# Patient Record
Sex: MALE | Race: BLACK OR AFRICAN AMERICAN | NOT HISPANIC OR LATINO | URBAN - METROPOLITAN AREA
[De-identification: names, ages, dates, MRNs, and addresses within clinical notes are randomized per-mention and may not be internally consistent; named-entity substitution may affect disease eponyms.]

---

## 2020-03-17 ENCOUNTER — EMERGENCY (EMERGENCY)
Facility: HOSPITAL | Age: 55
LOS: 1 days | Discharge: HOME | End: 2020-03-17
Attending: EMERGENCY MEDICINE | Admitting: EMERGENCY MEDICINE
Payer: COMMERCIAL

## 2020-03-17 VITALS
SYSTOLIC BLOOD PRESSURE: 158 MMHG | HEART RATE: 75 BPM | OXYGEN SATURATION: 100 % | RESPIRATION RATE: 16 BRPM | DIASTOLIC BLOOD PRESSURE: 103 MMHG | HEIGHT: 66 IN | TEMPERATURE: 98 F | WEIGHT: 160.06 LBS

## 2020-03-17 PROCEDURE — 99285 EMERGENCY DEPT VISIT HI MDM: CPT

## 2020-03-18 VITALS
DIASTOLIC BLOOD PRESSURE: 97 MMHG | TEMPERATURE: 98 F | HEART RATE: 87 BPM | SYSTOLIC BLOOD PRESSURE: 137 MMHG | RESPIRATION RATE: 16 BRPM | OXYGEN SATURATION: 100 %

## 2020-03-18 LAB
ALBUMIN SERPL ELPH-MCNC: 4.2 G/DL — SIGNIFICANT CHANGE UP (ref 3.3–5)
ALP SERPL-CCNC: 63 U/L — SIGNIFICANT CHANGE UP (ref 40–120)
ALT FLD-CCNC: 40 U/L — SIGNIFICANT CHANGE UP (ref 4–41)
ANION GAP SERPL CALC-SCNC: 12 MMO/L — SIGNIFICANT CHANGE UP (ref 7–14)
ANION GAP SERPL CALC-SCNC: 14 MMO/L — SIGNIFICANT CHANGE UP (ref 7–14)
APAP SERPL-MCNC: < 15 UG/ML — LOW (ref 15–25)
APPEARANCE UR: CLEAR — SIGNIFICANT CHANGE UP
AST SERPL-CCNC: 33 U/L — SIGNIFICANT CHANGE UP (ref 4–40)
BASE EXCESS BLDV CALC-SCNC: 3.5 MMOL/L — SIGNIFICANT CHANGE UP
BASOPHILS # BLD AUTO: 0.03 K/UL — SIGNIFICANT CHANGE UP (ref 0–0.2)
BASOPHILS NFR BLD AUTO: 0.4 % — SIGNIFICANT CHANGE UP (ref 0–2)
BILIRUB SERPL-MCNC: 0.8 MG/DL — SIGNIFICANT CHANGE UP (ref 0.2–1.2)
BILIRUB UR-MCNC: NEGATIVE — SIGNIFICANT CHANGE UP
BLOOD GAS VENOUS - CREATININE: 1.43 MG/DL — HIGH (ref 0.5–1.3)
BLOOD GAS VENOUS - FIO2: 21 — SIGNIFICANT CHANGE UP
BLOOD UR QL VISUAL: NEGATIVE — SIGNIFICANT CHANGE UP
BUN SERPL-MCNC: 13 MG/DL — SIGNIFICANT CHANGE UP (ref 7–23)
BUN SERPL-MCNC: 15 MG/DL — SIGNIFICANT CHANGE UP (ref 7–23)
CALCIUM SERPL-MCNC: 8.7 MG/DL — SIGNIFICANT CHANGE UP (ref 8.4–10.5)
CALCIUM SERPL-MCNC: 9.4 MG/DL — SIGNIFICANT CHANGE UP (ref 8.4–10.5)
CHLORIDE BLDV-SCNC: 102 MMOL/L — SIGNIFICANT CHANGE UP (ref 96–108)
CHLORIDE SERPL-SCNC: 102 MMOL/L — SIGNIFICANT CHANGE UP (ref 98–107)
CHLORIDE SERPL-SCNC: 98 MMOL/L — SIGNIFICANT CHANGE UP (ref 98–107)
CO2 SERPL-SCNC: 22 MMOL/L — SIGNIFICANT CHANGE UP (ref 22–31)
CO2 SERPL-SCNC: 25 MMOL/L — SIGNIFICANT CHANGE UP (ref 22–31)
COLOR SPEC: SIGNIFICANT CHANGE UP
CREAT SERPL-MCNC: 1.22 MG/DL — SIGNIFICANT CHANGE UP (ref 0.5–1.3)
CREAT SERPL-MCNC: 1.41 MG/DL — HIGH (ref 0.5–1.3)
EOSINOPHIL # BLD AUTO: 0.18 K/UL — SIGNIFICANT CHANGE UP (ref 0–0.5)
EOSINOPHIL NFR BLD AUTO: 2.5 % — SIGNIFICANT CHANGE UP (ref 0–6)
ETHANOL BLD-MCNC: < 10 MG/DL — SIGNIFICANT CHANGE UP
GAS PNL BLDV: 137 MMOL/L — SIGNIFICANT CHANGE UP (ref 136–146)
GLUCOSE BLDV-MCNC: 122 MG/DL — HIGH (ref 70–99)
GLUCOSE SERPL-MCNC: 113 MG/DL — HIGH (ref 70–99)
GLUCOSE SERPL-MCNC: 124 MG/DL — HIGH (ref 70–99)
GLUCOSE UR-MCNC: NEGATIVE — SIGNIFICANT CHANGE UP
HCO3 BLDV-SCNC: 26 MMOL/L — SIGNIFICANT CHANGE UP (ref 20–27)
HCT VFR BLD CALC: 40.7 % — SIGNIFICANT CHANGE UP (ref 39–50)
HCT VFR BLDV CALC: 45.7 % — SIGNIFICANT CHANGE UP (ref 39–51)
HGB BLD-MCNC: 14.3 G/DL — SIGNIFICANT CHANGE UP (ref 13–17)
HGB BLDV-MCNC: 14.9 G/DL — SIGNIFICANT CHANGE UP (ref 13–17)
IMM GRANULOCYTES NFR BLD AUTO: 0.1 % — SIGNIFICANT CHANGE UP (ref 0–1.5)
KETONES UR-MCNC: NEGATIVE — SIGNIFICANT CHANGE UP
LACTATE BLDV-MCNC: 1 MMOL/L — SIGNIFICANT CHANGE UP (ref 0.5–2)
LEUKOCYTE ESTERASE UR-ACNC: NEGATIVE — SIGNIFICANT CHANGE UP
LYMPHOCYTES # BLD AUTO: 3.09 K/UL — SIGNIFICANT CHANGE UP (ref 1–3.3)
LYMPHOCYTES # BLD AUTO: 42.6 % — SIGNIFICANT CHANGE UP (ref 13–44)
MAGNESIUM SERPL-MCNC: 1.7 MG/DL — SIGNIFICANT CHANGE UP (ref 1.6–2.6)
MAGNESIUM SERPL-MCNC: 1.8 MG/DL — SIGNIFICANT CHANGE UP (ref 1.6–2.6)
MCHC RBC-ENTMCNC: 27.9 PG — SIGNIFICANT CHANGE UP (ref 27–34)
MCHC RBC-ENTMCNC: 35.1 % — SIGNIFICANT CHANGE UP (ref 32–36)
MCV RBC AUTO: 79.5 FL — LOW (ref 80–100)
MONOCYTES # BLD AUTO: 0.51 K/UL — SIGNIFICANT CHANGE UP (ref 0–0.9)
MONOCYTES NFR BLD AUTO: 7 % — SIGNIFICANT CHANGE UP (ref 2–14)
NEUTROPHILS # BLD AUTO: 3.43 K/UL — SIGNIFICANT CHANGE UP (ref 1.8–7.4)
NEUTROPHILS NFR BLD AUTO: 47.4 % — SIGNIFICANT CHANGE UP (ref 43–77)
NITRITE UR-MCNC: NEGATIVE — SIGNIFICANT CHANGE UP
NRBC # FLD: 0 K/UL — SIGNIFICANT CHANGE UP (ref 0–0)
PCO2 BLDV: 49 MMHG — SIGNIFICANT CHANGE UP (ref 41–51)
PH BLDV: 7.38 PH — SIGNIFICANT CHANGE UP (ref 7.32–7.43)
PH UR: 6.5 — SIGNIFICANT CHANGE UP (ref 5–8)
PHOSPHATE SERPL-MCNC: 3.2 MG/DL — SIGNIFICANT CHANGE UP (ref 2.5–4.5)
PHOSPHATE SERPL-MCNC: 3.6 MG/DL — SIGNIFICANT CHANGE UP (ref 2.5–4.5)
PLATELET # BLD AUTO: 183 K/UL — SIGNIFICANT CHANGE UP (ref 150–400)
PMV BLD: 11.1 FL — SIGNIFICANT CHANGE UP (ref 7–13)
PO2 BLDV: 29 MMHG — LOW (ref 35–40)
POTASSIUM BLDV-SCNC: 3.5 MMOL/L — SIGNIFICANT CHANGE UP (ref 3.4–4.5)
POTASSIUM SERPL-MCNC: 3.8 MMOL/L — SIGNIFICANT CHANGE UP (ref 3.5–5.3)
POTASSIUM SERPL-MCNC: 3.8 MMOL/L — SIGNIFICANT CHANGE UP (ref 3.5–5.3)
POTASSIUM SERPL-SCNC: 3.8 MMOL/L — SIGNIFICANT CHANGE UP (ref 3.5–5.3)
POTASSIUM SERPL-SCNC: 3.8 MMOL/L — SIGNIFICANT CHANGE UP (ref 3.5–5.3)
PROT SERPL-MCNC: 7.8 G/DL — SIGNIFICANT CHANGE UP (ref 6–8.3)
PROT UR-MCNC: NEGATIVE — SIGNIFICANT CHANGE UP
RBC # BLD: 5.12 M/UL — SIGNIFICANT CHANGE UP (ref 4.2–5.8)
RBC # FLD: 14.1 % — SIGNIFICANT CHANGE UP (ref 10.3–14.5)
SALICYLATES SERPL-MCNC: < 5 MG/DL — LOW (ref 15–30)
SAO2 % BLDV: 47.6 % — LOW (ref 60–85)
SODIUM SERPL-SCNC: 135 MMOL/L — SIGNIFICANT CHANGE UP (ref 135–145)
SODIUM SERPL-SCNC: 138 MMOL/L — SIGNIFICANT CHANGE UP (ref 135–145)
SP GR SPEC: 1.01 — SIGNIFICANT CHANGE UP (ref 1–1.04)
TROPONIN T, HIGH SENSITIVITY: < 6 NG/L — SIGNIFICANT CHANGE UP (ref ?–14)
TSH SERPL-MCNC: 3.48 UIU/ML — SIGNIFICANT CHANGE UP (ref 0.27–4.2)
UROBILINOGEN FLD QL: NORMAL — SIGNIFICANT CHANGE UP
WBC # BLD: 7.25 K/UL — SIGNIFICANT CHANGE UP (ref 3.8–10.5)
WBC # FLD AUTO: 7.25 K/UL — SIGNIFICANT CHANGE UP (ref 3.8–10.5)

## 2020-03-18 PROCEDURE — 70450 CT HEAD/BRAIN W/O DYE: CPT | Mod: 26

## 2020-03-18 PROCEDURE — 99218: CPT

## 2020-03-18 PROCEDURE — 70551 MRI BRAIN STEM W/O DYE: CPT | Mod: 26

## 2020-03-18 PROCEDURE — 99284 EMERGENCY DEPT VISIT MOD MDM: CPT | Mod: GC

## 2020-03-18 PROCEDURE — 70544 MR ANGIOGRAPHY HEAD W/O DYE: CPT | Mod: 26,59

## 2020-03-18 PROCEDURE — 71046 X-RAY EXAM CHEST 2 VIEWS: CPT | Mod: 26

## 2020-03-18 PROCEDURE — 70549 MR ANGIOGRAPH NECK W/O&W/DYE: CPT | Mod: 26

## 2020-03-18 RX ORDER — SODIUM CHLORIDE 9 MG/ML
1000 INJECTION INTRAMUSCULAR; INTRAVENOUS; SUBCUTANEOUS ONCE
Refills: 0 | Status: COMPLETED | OUTPATIENT
Start: 2020-03-18 | End: 2020-03-18

## 2020-03-18 RX ADMIN — SODIUM CHLORIDE 1000 MILLILITER(S): 9 INJECTION INTRAMUSCULAR; INTRAVENOUS; SUBCUTANEOUS at 02:02

## 2020-03-18 NOTE — ED CDU PROVIDER INITIAL DAY NOTE - ATTENDING CONTRIBUTION TO CARE
Gong: I have seen and examined the patient face to face, have reviewed and addended the HPI, PE and a/p as necessary.     53 yo M with migraines a/w 1 month of intermittent dizziness and unsteady gait.  Pt reports he feels like he is "swaying while I am walking." Pt reports no sudden onset.  Reports occasional feeling of passing out.  Pt also reports intermittent chest discomfort and L arm tingling describes as pinching.  Noted to have no exertional symptoms. No fever/chills, No photophobia/eye pain/changes in vision, No ear pain/sore throat/dysphagia, No palpitations, no SOB/cough/wheeze/stridor, No abd pain, No N/V/D, no dysuria/frequency/discharge, No neck/back pain, no rash.    In the ED, pt had CT head normal. labs wnl, trop neg, cxr normal. Tox negative, neuro consulted.     +ETOH history   Non Smoker    GEN - NAD; well appearing; A+O x3; non-toxic appearing  CARD -s1s2, RRR, no M,G,R; PULM - CTA b/l, symmetric breath sounds; ABD -  +BS, ND, NT, soft, no guarding, no rebound, no masses; BACK - no CVA tenderness, Normal  spine; EXT - symmetric pulses, 2+ dp, capillary refill < 2 seconds, no cyanosis, no edema; NEURO: alert, CN 2-12 intact, reflexes nl, sensation nl, coordination nl, finger to nose nl, romberg negative, motor 5/5 RUE/LUE/RLE/LLE/EHL/Plantar flexion, no pronator drift, gait tandem with unsteadiness.      53 yo M a/w intermittent dizziness and unsteady gait x 1 month, with associated intermittent chest pain, ekg wnl, non-ischemic, troponin neg, tox screen neg.  CTH wnl.  Pt is pending MRI neuro checks, and re-eval in cdu.

## 2020-03-18 NOTE — ED CDU PROVIDER DISPOSITION NOTE - CLINICAL COURSE
54 y.o male with PMHX of migraines who presents to ED c/o 1 month of intermittent dizziness described as "feeling I am moving/swaying". Pt seen and worked up in ED, CT head normal. labs wnl, trop neg, cxr normal. Tox negative, neuro consulted. Pt transferred to CDU for; MRI/MRA, neuro consult, vitals q4, neuro checks and frequent re-evals. 54 y.o male with PMHX of migraines who presents to ED c/o 1 month of intermittent dizziness described as "feeling I am moving/swaying". Pt seen and worked up in ED, CT head normal. labs wnl, trop neg, cxr normal. Tox negative, neuro consulted. Pt transferred to CDU for; MRI/MRA, neuro consult, vitals q4, neuro checks and frequent re-evals. Cleared by Neurology Attending for discharge home with outpatient Neurology followup.

## 2020-03-18 NOTE — ED PROVIDER NOTE - CLINICAL SUMMARY MEDICAL DECISION MAKING FREE TEXT BOX
Patient is a 54 y.o male with PMHX of migraines who presents to ED c/o 1 month of intermittent dizziness described as "feeling I am moving/swaying". Plan- labs, cxr, fluids, ct head, will monitor and reassess

## 2020-03-18 NOTE — ED CDU PROVIDER INITIAL DAY NOTE - PROGRESS NOTE DETAILS
Pt signed out to me to follow up MRI and neuro reassessment- seen by Attending Dr. Unger who cleared pt for discharge home. Pt ambulating, wants to leave. tolerating po intake. 5/5 strength in all extremities, sensation intact, finger to nose normal, normal gait. Discussed all results with patient including incidental findings and given copies of reports. Advised to follow up with Neurology and return precautions given.

## 2020-03-18 NOTE — ED CDU PROVIDER DISPOSITION NOTE - ATTENDING CONTRIBUTION TO CARE
54 y.o male with PMHX of migraines who presents to ED c/o 1 month of intermittent dizziness described as "feeling I am moving/swaying". Pt seen and worked up in ED, CT head normal. labs wnl, trop neg, cxr normal. Tox negative, neuro consulted. Pt transferred to CDU for; MRI/MRA, neuro consult, vitals q4, neuro checks and frequent re-evals.    General: Patient in no apparent distress, AAO x 3  Skin: Dry and intact  HEENT: Oral mucosa moist. No pharyngeal exudates or tonsillar enlargement  Eyes: Conjunctiva normal  Cardiac: Regular rhythm and rate. No pretibial edema b/l  Respiratory: Lungs clear b/l and symmetric. No respiratory distress  Gastrointestinal: Abdomen soft, nondistended, nontender  Musculoskeletal: Moves all extremities spontaneously  Neurological: alert and oriented to person, place and time  Psychiatric: Cooperative 54 y.o male with PMHX of migraines who presents to ED c/o 1 month of intermittent dizziness described as "feeling I am moving/swaying". Pt seen and worked up in ED, CT head normal. labs wnl, trop neg, cxr normal. Tox negative, neuro consulted. Pt transferred to CDU for; MRI/MRA, neuro consult, vitals q4, neuro checks and frequent re-evals.    MRI with no acute findings  d/c-ed home with outpatient neuro f/u    General: Patient in no apparent distress, AAO x 3  Skin: Dry and intact  HEENT: Oral mucosa moist. No pharyngeal exudates or tonsillar enlargement  Eyes: Conjunctiva normal  Cardiac: Regular rhythm and rate. No pretibial edema b/l  Respiratory: Lungs clear b/l and symmetric. No respiratory distress  Gastrointestinal: Abdomen soft, nondistended, nontender  Musculoskeletal: Moves all extremities spontaneously  Neurological: alert and oriented to person, place and time, normal gait, neg romberg and pronator drift signs  Psychiatric: Cooperative

## 2020-03-18 NOTE — CONSULT NOTE ADULT - SUBJECTIVE AND OBJECTIVE BOX
MRN-[id number]  chief complaint: dizziness    HPI: 54 y.o male with PMHX of migraines who presents to ED c/o 1 month of intermittent dizziness described as "feeling I am moving/swaying". feels that he is falling towards the left side. States sx occur even while at rest, states sometimes feels he may pass out. Pt also notes intermittent Lt arm tingling and chest discomfort described as pinching, also over past month. Pt does note drinking alcohol usually rum at least 3 times a week, unable to fully quantify but states "probably a pint a week". Notes drinking shot of whiskey today because he states "it puts me at ease". Pt  denies changes in vision, headache, n/v/d, abdominal pain, neck pain, back pain, weakness or any other complaints.  Denies sig. fhx. No hx of cardiac work up. Denies drug use or smoking. Denies vertigo, diplopia    he has a history of migraines, which he reports he get photophobia, phonophobia, and has difficulty lying down.     PAST MEDICAL & SURGICAL HISTORY:  Migraines  No significant past surgical history    FAMILY HISTORY:    Social Hx:  drinks rum      Allergies  No Known Allergies    REVIEW OF SYSTEMS    ROS: Pertinent positives in HPI, all other ROS were reviewed and are negative.      Vital Signs Last 24 Hrs  T(C): 36.6 (18 Mar 2020 05:18), Max: 36.8 (17 Mar 2020 23:36)  T(F): 97.8 (18 Mar 2020 05:18), Max: 98.3 (17 Mar 2020 23:36)  HR: 57 (18 Mar 2020 05:18) (57 - 75)  BP: 146/95 (18 Mar 2020 05:18) (143/105 - 158/103)  BP(mean): --  RR: 16 (18 Mar 2020 05:18) (16 - 16)  SpO2: 100% (18 Mar 2020 05:18) (100% - 100%)    NEUROLOGICAL EXAM:  MS: AAOX3, fluent, attends b/l; recent and remote memory intact; normal attention, language   CN: VFF, EOMI, normal saccades, no facial asymmetry  Motor: Strength: no drift all 4 extremities.  Coordination: no dysmetria. minimal overshoot on finger won in b/l UE. intact rapid alternating movements b/l.  Gait:  normal gait, able to tandem    Labs:   cbc                      14.3   7.25  )-----------( 183      ( 18 Mar 2020 01:32 )             40.7     [id number]    135  |  98  |  15  ----------------------------<  124<H>  3.8   |  25  |  1.41<H>    Ca    9.4      18 Mar 2020 01:32  Phos  3.2     03-18  Mg     1.8     -18    TPro  7.8  /  Alb  4.2  /  TBili  0.8  /  DBili  x   /  AST  33  /  ALT  40  /  AlkPhos  63  03-18    Coags  Lipids  A1C  CardiacMarkers    LFTsLIVER FUNCTIONS - ( 18 Mar 2020 01:32 )  Alb: 4.2 g/dL / Pro: 7.8 g/dL / ALK PHOS: 63 u/L / ALT: 40 u/L / AST: 33 u/L / GGT: x           UAUrinalysis Basic - ( 18 Mar 2020 02:44 )    Color: LIGHT YELLOW / Appearance: CLEAR / S.012 / pH: 6.5  Gluc: NEGATIVE / Ketone: NEGATIVE  / Bili: NEGATIVE / Urobili: NORMAL   Blood: NEGATIVE / Protein: NEGATIVE / Nitrite: NEGATIVE   Leuk Esterase: NEGATIVE / RBC: x / WBC x   Sq Epi: x / Non Sq Epi: x / Bacteria: x      CSF  Immunological Labs    Radiology:  -CT Head: < from: CT Head No Cont (20 @ 02:46) >    IMPRESSION:     No acute intracranial bleeding, mass effect, or shift.     < end of copied text >

## 2020-03-18 NOTE — ED PROVIDER NOTE - PHYSICAL EXAMINATION
Vital signs reviewed.   CONSTITUTIONAL:  in no apparent distress. Non-toxic appearing.   HEAD: Normocephalic, atraumatic.  EYES: PERRL, EOM intact, conjunctiva and sclera WNL.  ENT: normal nose; no rhinorrhea; normal pharynx with no tonsillar hypertrophy, no erythema, no exudate, no lymphadenopathy.  NECK/LYMPH: Supple; non-tender;   CARD: Normal S1, S2; no murmurs, rubs, or gallops noted.  RESP: Normal chest excursion with respiration; breath sounds clear and equal bilaterally; no wheezes, rhonchi, or rales.  ABD/GI: soft, non-distended; non-tender  EXT/MS: moves all extremities;   SKIN: Normal for age and race; warm; dry; good turgor; no apparent lesions or exudate noted.  NEURO: Awake, alert, oriented x 3, no gross deficits, no pronator drift. ?tongue fasciculations, no facial droop. No slurred speech. Mild ataxia with tandem gait.  no motor or sensory deficit noted.  PSYCH: Normal mood; appropriate affect.

## 2020-03-18 NOTE — ED CDU PROVIDER INITIAL DAY NOTE - OBJECTIVE STATEMENT
Patient is a 54 y.o male with PMHX of migraines who presents to ED c/o 1 month of intermittent dizziness described as "feeling I am moving/swaying". States sx occur even while at rest, states sometimes feels he may pass out. Pt also notes intermittent Lt arm tingling and chest discomfort described as pinching, also over past month. Pt does note drinking alcohol usually rum at least 3 times a week, unable to fully quantify but states "probably a pint a week". Notes drinking shot of whiskey today because he states "it puts me at ease". Pt  denies changes in vision, headache, n/v/d, abdominal pain, neck pain, back pain, weakness or any other complaints.  Denies sig. fhx. No hx of cardiac work up. Denies drug use or smoking. Pt seen and worked up in ED, CT head normal. labs wnl, trop neg, cxr normal. Tox negative, neuro consulted. Pt transferred to CDU for; MRI/MRA, neuro consult, vitals q4, neuro checks and frequent re-evals.

## 2020-03-18 NOTE — ED CDU PROVIDER DISPOSITION NOTE - CARE PROVIDER_API CALL
Giorgi Unger (DO)  Neurology  [address], [address]  [place] [address]  Phone: [phone]  Fax: [phone]  Follow Up Time:

## 2020-03-18 NOTE — ED CDU PROVIDER DISPOSITION NOTE - NSFOLLOWUPINSTRUCTIONS_ED_ALL_ED_FT
Follow up with your primary care physician in 48-72 hours- bring copies of your results.    Follow up with Neurology Dr. Unger as discussed.  Return to the Emergency Department for worsening or persistent symptoms OR ANY NEW OR CONCERNING SYMPTOMS.

## 2020-03-18 NOTE — CONSULT NOTE ADULT - ASSESSMENT
Impression: His symptoms of chest discomfort, lightheadedness, and left arm tingling could be cardiac in etiology. May need cardiac workup.    Plan:  -cardiac w/u (telemetry, TTE)  -consider MRI brain w/o contrast Impression: His symptoms of chest discomfort, lightheadedness, and left arm tingling could be cardiac in etiology. May need cardiac workup.    Plan:    -cardiac w/u (telemetry, TTE)  -consider MRI brain w/o contrast

## 2020-03-18 NOTE — ED PROVIDER NOTE - OBJECTIVE STATEMENT
HPI: Patient is a 54 y.o male with PMHX of migraines who presents to ED c/o 1 month of intermittent dizziness described as "feeling I am moving/swaying". States sx occur even while at rest, states sometimes feels he may pass out. Pt also notes intermittent Lt arm tingling and chest discomfort described as pinching, also over past month. Pt does note drinking alcohol usually rum at least 3 times a week, unable to fully quantify but states "probably a pint a week". Notes drinking shot of whiskey today because he states "it puts me at ease". Pt  denies changes in vision, headache, n/v/d, abdominal pain, neck pain, back pain, weakness or any other complaints.  Denies sig. fhx. No hx of cardiac work up. Denies drug use or smoking.

## 2020-03-18 NOTE — CONSULT NOTE ADULT - ATTENDING COMMENTS
Patient seen and examined with neurology resident and above note reviewed and I agree with assessment and plan as outlined. Hx as noted and patient reports when he is standing and looks to the right or left he will feel a sense of imbalance and lightheadedness. No nausea or vomiting and he has not fallen. In addition he will experience chest discomfort and pain.  His neurologic examination is nonfocal and no nystagmus or other cranial nerve deficits.   He can walk and no dysmetria.  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  Please check orthostatic blood pressures and if normal then no further neurologic workup as inpatient.  I explained to patient and he understood plan.   He may follow up in neurology clinic as needed at [phone].

## 2020-03-18 NOTE — ED PROVIDER NOTE - ATTENDING CONTRIBUTION TO CARE
Gong: I have seen and examined the patient face to face, have reviewed and addended the HPI, PE and a/p as necessary.    55 yo M with migraines a/w 1 month of intermittent dizziness and unsteady gait.  Pt reports he feels like he is "swaying while I am walking." Pt reports no sudden onset.  Reports occasional feeling of passing out.  Pt also reports intermittent chest discomfort and L arm tingling describes as pinching.  Noted to have no exertional symptoms. No fever/chills, No photophobia/eye pain/changes in vision, No ear pain/sore throat/dysphagia, No palpitations, no SOB/cough/wheeze/stridor, No abd pain, No N/V/D, no dysuria/frequency/discharge, No neck/back pain, no rash.    +ETOH history   Non Smoker    GEN - NAD; well appearing; A+O x3; non-toxic appearing  CARD -s1s2, RRR, no M,G,R; PULM - CTA b/l, symmetric breath sounds; ABD -  +BS, ND, NT, soft, no guarding, no rebound, no masses; BACK - no CVA tenderness, Normal  spine; EXT - symmetric pulses, 2+ dp, capillary refill < 2 seconds, no cyanosis, no edema; NEURO: alert, CN 2-12 intact, reflexes nl, sensation nl, coordination nl, finger to nose nl, romberg negative, motor 5/5 RUE/LUE/RLE/LLE/EHL/Plantar flexion, no pronator drift, gait tandem with unsteadiness.      55 yo M a/w intermittent dizziness and unsteady gait x 1 month, with associated intermittent chest pain, ekg wnl, non-ischemic, follow up troponin, cbc,cmp, history atypical for acs.  Dizziness concerning for possible atypical migraine, vs posterior cva, will obtain ct head, neuro consult and mri.  Reassess.  Possible CDU for MRI.

## 2020-03-18 NOTE — ED CDU PROVIDER INITIAL DAY NOTE - MEDICAL DECISION MAKING DETAILS
Patient is a 54 y.o male with PMHX of migraines who presents to ED c/o 1 month of intermittent dizziness described as "feeling I am moving/swaying". Pt seen and worked up in ED, CT head normal. labs wnl, trop neg, cxr normal. Tox negative, neuro consulted. Pt transferred to CDU for; MRI/MRA, neuro consult, vitals q4, neuro checks and frequent re-evals.

## 2020-03-18 NOTE — ED CDU PROVIDER DISPOSITION NOTE - PATIENT PORTAL LINK FT
You can access the FollowMyHealth Patient Portal offered by [facility] by registering at the following website: [email or web address]. By joining [employer]’s FollowMyHealth portal, you will also be able to view your health information using other applications (apps) compatible with our system.

## 2020-03-19 LAB
CULTURE RESULTS: SIGNIFICANT CHANGE UP
SPECIMEN SOURCE: SIGNIFICANT CHANGE UP
